# Patient Record
Sex: FEMALE | Race: WHITE | NOT HISPANIC OR LATINO | Employment: FULL TIME | URBAN - METROPOLITAN AREA
[De-identification: names, ages, dates, MRNs, and addresses within clinical notes are randomized per-mention and may not be internally consistent; named-entity substitution may affect disease eponyms.]

---

## 2022-07-15 ENCOUNTER — SOCIAL WORK (OUTPATIENT)
Dept: ADMINISTRATIVE | Facility: OTHER | Age: 22
End: 2022-07-15

## 2022-07-15 NOTE — PROGRESS NOTES
SW completed pt's notification of pregnancy and faxed/scanned into epic. No other needs identified at this time.    Xenia Guy,MSW  Pager#[phone]

## 2022-08-12 PROBLEM — Z34.00 SUPERVISION OF NORMAL FIRST PREGNANCY, ANTEPARTUM: Status: ACTIVE | Noted: 2022-08-12
